# Patient Record
Sex: MALE | Race: WHITE | Employment: OTHER | URBAN - METROPOLITAN AREA
[De-identification: names, ages, dates, MRNs, and addresses within clinical notes are randomized per-mention and may not be internally consistent; named-entity substitution may affect disease eponyms.]

---

## 2017-07-17 ENCOUNTER — HOSPITAL ENCOUNTER (OUTPATIENT)
Age: 77
Setting detail: SPECIMEN
Discharge: HOME | End: 2017-07-17
Payer: MEDICARE

## 2017-07-19 LAB — DERMATOLOGY PATHOLOGY REPORT: NORMAL

## 2017-09-13 PROBLEM — D23.9 DYSPLASTIC NEVUS: Status: ACTIVE | Noted: 2017-09-13

## 2019-01-17 PROBLEM — D48.5 NEOPLASM OF UNCERTAIN BEHAVIOR OF SKIN: Status: ACTIVE | Noted: 2019-01-17

## 2019-10-18 ENCOUNTER — OFFICE VISIT (OUTPATIENT)
Dept: PULMONOLOGY | Age: 79
End: 2019-10-18
Payer: MEDICARE

## 2019-10-18 VITALS
HEIGHT: 68 IN | RESPIRATION RATE: 12 BRPM | DIASTOLIC BLOOD PRESSURE: 74 MMHG | OXYGEN SATURATION: 95 % | BODY MASS INDEX: 27.49 KG/M2 | HEART RATE: 65 BPM | WEIGHT: 181.4 LBS | SYSTOLIC BLOOD PRESSURE: 120 MMHG

## 2019-10-18 DIAGNOSIS — D48.5 NEOPLASM OF UNCERTAIN BEHAVIOR OF SKIN: ICD-10-CM

## 2019-10-18 DIAGNOSIS — G47.33 OBSTRUCTIVE SLEEP APNEA SYNDROME: Primary | ICD-10-CM

## 2019-10-18 PROCEDURE — 99203 OFFICE O/P NEW LOW 30 MIN: CPT | Performed by: INTERNAL MEDICINE

## 2019-10-18 ASSESSMENT — ENCOUNTER SYMPTOMS
APNEA: 1
EYES NEGATIVE: 1

## 2019-10-18 ASSESSMENT — SLEEP AND FATIGUE QUESTIONNAIRES
ESS TOTAL SCORE: 10
HOW LIKELY ARE YOU TO NOD OFF OR FALL ASLEEP WHILE SITTING AND READING: 1
HOW LIKELY ARE YOU TO NOD OFF OR FALL ASLEEP WHILE WATCHING TV: 2
HOW LIKELY ARE YOU TO NOD OFF OR FALL ASLEEP WHEN YOU ARE A PASSENGER IN A CAR FOR AN HOUR WITHOUT A BREAK: 0
HOW LIKELY ARE YOU TO NOD OFF OR FALL ASLEEP WHILE SITTING QUIETLY AFTER LUNCH WITHOUT ALCOHOL: 2
HOW LIKELY ARE YOU TO NOD OFF OR FALL ASLEEP IN A CAR, WHILE STOPPED FOR A FEW MINUTES IN TRAFFIC: 0
HOW LIKELY ARE YOU TO NOD OFF OR FALL ASLEEP WHILE LYING DOWN TO REST IN THE AFTERNOON WHEN CIRCUMSTANCES PERMIT: 3
HOW LIKELY ARE YOU TO NOD OFF OR FALL ASLEEP WHILE SITTING INACTIVE IN A PUBLIC PLACE: 2
HOW LIKELY ARE YOU TO NOD OFF OR FALL ASLEEP WHILE SITTING AND TALKING TO SOMEONE: 0

## 2019-11-08 ENCOUNTER — HOSPITAL ENCOUNTER (OUTPATIENT)
Dept: SLEEP CENTER | Age: 79
Discharge: HOME | End: 2019-11-10
Payer: MEDICARE

## 2019-11-08 DIAGNOSIS — G47.33 OBSTRUCTIVE SLEEP APNEA SYNDROME: ICD-10-CM

## 2019-11-08 DIAGNOSIS — D48.5 NEOPLASM OF UNCERTAIN BEHAVIOR OF SKIN: ICD-10-CM

## 2019-11-08 PROCEDURE — 95810 POLYSOM 6/> YRS 4/> PARAM: CPT

## 2019-12-05 LAB — STATUS: NORMAL

## 2019-12-11 ENCOUNTER — HOSPITAL ENCOUNTER (OUTPATIENT)
Dept: SLEEP CENTER | Age: 79
Discharge: HOME | End: 2019-12-13
Payer: MEDICARE

## 2019-12-11 DIAGNOSIS — D48.5 NEOPLASM OF UNCERTAIN BEHAVIOR OF SKIN: ICD-10-CM

## 2019-12-11 DIAGNOSIS — G47.33 OBSTRUCTIVE SLEEP APNEA SYNDROME: ICD-10-CM

## 2019-12-11 PROCEDURE — 95811 POLYSOM 6/>YRS CPAP 4/> PARM: CPT

## 2019-12-12 VITALS
HEART RATE: 62 BPM | HEIGHT: 68 IN | RESPIRATION RATE: 14 BRPM | BODY MASS INDEX: 27.28 KG/M2 | WEIGHT: 180 LBS | OXYGEN SATURATION: 97 %

## 2019-12-19 LAB — STATUS: NORMAL

## 2020-02-14 ENCOUNTER — OFFICE VISIT (OUTPATIENT)
Dept: PULMONOLOGY | Age: 80
End: 2020-02-14
Payer: MEDICARE

## 2020-02-14 VITALS
OXYGEN SATURATION: 99 % | DIASTOLIC BLOOD PRESSURE: 72 MMHG | SYSTOLIC BLOOD PRESSURE: 133 MMHG | HEART RATE: 62 BPM | WEIGHT: 182.4 LBS | RESPIRATION RATE: 18 BRPM | HEIGHT: 68 IN | BODY MASS INDEX: 27.65 KG/M2

## 2020-02-14 PROCEDURE — 99213 OFFICE O/P EST LOW 20 MIN: CPT | Performed by: INTERNAL MEDICINE

## 2020-02-14 ASSESSMENT — ENCOUNTER SYMPTOMS
EYES NEGATIVE: 1
RESPIRATORY NEGATIVE: 1

## 2020-02-26 PROBLEM — H90.5 SENSORINEURAL HEARING LOSS: Status: ACTIVE | Noted: 2017-05-30

## 2020-02-26 PROBLEM — R07.9 CHEST PAIN: Status: ACTIVE | Noted: 2017-10-15

## 2020-02-26 PROBLEM — R97.20 ELEVATED PSA: Status: ACTIVE | Noted: 2017-02-14

## 2020-02-26 PROBLEM — C64.9 RENAL CANCER (HCC): Status: ACTIVE | Noted: 2017-02-13

## 2020-02-26 PROBLEM — N40.1 BPH WITH OBSTRUCTION/LOWER URINARY TRACT SYMPTOMS: Status: ACTIVE | Noted: 2017-05-30

## 2020-02-26 PROBLEM — H21.569: Status: ACTIVE | Noted: 2017-05-30

## 2020-02-26 PROBLEM — R09.89 BRUIT: Status: ACTIVE | Noted: 2017-10-17

## 2020-02-26 PROBLEM — N28.1 RENAL CYST: Status: ACTIVE | Noted: 2017-02-13

## 2020-02-26 PROBLEM — N13.8 BPH WITH OBSTRUCTION/LOWER URINARY TRACT SYMPTOMS: Status: ACTIVE | Noted: 2017-05-30

## 2020-02-26 PROBLEM — E87.5 HYPERKALEMIA: Status: ACTIVE | Noted: 2018-06-08

## 2020-02-26 PROBLEM — M51.369 DEGENERATION OF INTERVERTEBRAL DISC OF LUMBAR REGION: Status: ACTIVE | Noted: 2017-05-30

## 2020-02-26 PROBLEM — M51.36 DEGENERATION OF INTERVERTEBRAL DISC OF LUMBAR REGION: Status: ACTIVE | Noted: 2017-05-30

## 2020-02-26 PROBLEM — R39.9 LOWER URINARY TRACT SYMPTOMS: Status: ACTIVE | Noted: 2017-02-13

## 2020-02-26 PROBLEM — N21.0 BLADDER CALCULUS: Status: ACTIVE | Noted: 2017-03-07

## 2021-03-19 ENCOUNTER — OFFICE VISIT (OUTPATIENT)
Dept: PULMONOLOGY | Age: 81
End: 2021-03-19
Payer: MEDICARE

## 2021-03-19 VITALS
TEMPERATURE: 97.3 F | SYSTOLIC BLOOD PRESSURE: 137 MMHG | OXYGEN SATURATION: 97 % | BODY MASS INDEX: 26.67 KG/M2 | HEART RATE: 62 BPM | DIASTOLIC BLOOD PRESSURE: 70 MMHG | RESPIRATION RATE: 12 BRPM | HEIGHT: 68 IN | WEIGHT: 176 LBS

## 2021-03-19 DIAGNOSIS — Z99.89 OSA ON CPAP: Primary | ICD-10-CM

## 2021-03-19 DIAGNOSIS — G47.33 OSA ON CPAP: Primary | ICD-10-CM

## 2021-03-19 DIAGNOSIS — D48.5 NEOPLASM OF UNCERTAIN BEHAVIOR OF SKIN: ICD-10-CM

## 2021-03-19 DIAGNOSIS — F03.90 DEMENTIA WITHOUT BEHAVIORAL DISTURBANCE, UNSPECIFIED DEMENTIA TYPE: ICD-10-CM

## 2021-03-19 PROCEDURE — 99213 OFFICE O/P EST LOW 20 MIN: CPT | Performed by: INTERNAL MEDICINE

## 2021-03-19 RX ORDER — DONEPEZIL HYDROCHLORIDE 5 MG/1
10 TABLET, FILM COATED ORAL NIGHTLY
COMMUNITY

## 2021-03-19 ASSESSMENT — ENCOUNTER SYMPTOMS
EYES NEGATIVE: 1
RESPIRATORY NEGATIVE: 1

## 2021-03-19 ASSESSMENT — SLEEP AND FATIGUE QUESTIONNAIRES
HOW LIKELY ARE YOU TO NOD OFF OR FALL ASLEEP WHEN YOU ARE A PASSENGER IN A CAR FOR AN HOUR WITHOUT A BREAK: 1
HOW LIKELY ARE YOU TO NOD OFF OR FALL ASLEEP IN A CAR, WHILE STOPPED FOR A FEW MINUTES IN TRAFFIC: 0
HOW LIKELY ARE YOU TO NOD OFF OR FALL ASLEEP WHILE WATCHING TV: 1
HOW LIKELY ARE YOU TO NOD OFF OR FALL ASLEEP WHILE SITTING AND READING: 3

## 2021-03-19 NOTE — PROGRESS NOTES
Subjective:      Patient ID: Colonel Ch is a 80 y.o. male. HPI  Patient returns for follow up of sleep apnea. Since last visit 12 months ago, patient has been very compliant with CPAP. Uses every night, for the entire night. Reports refreshing and restorative sleep. Denies snoring. Reports normal daytime alertness. Believes benefiting greatly. Compliance download from 12/11/20 to 3/10/21 shows 100% compliance for average of 8hrs per night. Residual AHI 2.9. Mean pressure8.1 cm H2O. Wife states that he has mild dementia. Currently on small dose of Aricept. No longer writing his conventional bike although he rides a recumbent cycle and seems to be enjoying it. Remains physically active. Review of Systems   Constitutional: Negative. HENT: Negative. Eyes: Negative. Respiratory: Negative. Cardiovascular: Negative. Psychiatric/Behavioral: Positive for confusion. All other systems reviewed and are negative. Objective:     Physical Exam  Vitals signs and nursing note reviewed. Constitutional:       Appearance: He is well-developed. HENT:      Mouth/Throat:      Comments: Large tongue , low hanging soft palate and large uvula. Overall pharyngeal orifice moderately decreased    Eyes:      General: No scleral icterus. Conjunctiva/sclera: Conjunctivae normal.   Neck:      Musculoskeletal: Neck supple. Thyroid: No thyromegaly. Vascular: No JVD. Trachea: No tracheal deviation. Cardiovascular:      Rate and Rhythm: Normal rate and regular rhythm. Heart sounds: Normal heart sounds. No murmur. No gallop. Pulmonary:      Effort: Pulmonary effort is normal. No respiratory distress. Breath sounds: No wheezing or rales. Chest:      Chest wall: No tenderness. Abdominal:      Palpations: Abdomen is soft. Tenderness: There is no abdominal tenderness. Lymphadenopathy:      Cervical: No cervical adenopathy. Skin:     General: Skin is warm and dry. Neurological:      Mental Status: He is alert and oriented to person, place, and time. Wt Readings from Last 3 Encounters:   03/19/21 176 lb (79.8 kg)   03/09/21 173 lb (78.5 kg)   03/03/20 180 lb (81.6 kg)          Results for orders placed or performed during the hospital encounter of 12/11/19   Sleep Study with PAP Titration   Result Value Ref Range    Status Interpreted        Assessment:         1. YARIEL on CPAP    2. Neoplasm of uncertain behavior of skin    3. Dementia without behavioral disturbance, unspecified dementia type ([facility] [address]          Plan:      1. Continue CPAP. 2. Avoid sedative hypnotics and alcohol at bedtime. 3. Maintain optimal weight. 4. Patient and wife received both of their Covid vaccines. 5. Return in 1 year.      Electronically signed by Frankie Lord DO on 3/19/2021at 1:41 PM

## 2022-03-25 ENCOUNTER — OFFICE VISIT (OUTPATIENT)
Dept: PULMONOLOGY | Age: 82
End: 2022-03-25
Payer: MEDICARE

## 2022-03-25 VITALS
RESPIRATION RATE: 16 BRPM | TEMPERATURE: 98 F | SYSTOLIC BLOOD PRESSURE: 134 MMHG | DIASTOLIC BLOOD PRESSURE: 75 MMHG | BODY MASS INDEX: 27.28 KG/M2 | WEIGHT: 180 LBS | OXYGEN SATURATION: 98 % | HEART RATE: 62 BPM | HEIGHT: 68 IN

## 2022-03-25 DIAGNOSIS — Z99.89 OSA ON CPAP: Primary | ICD-10-CM

## 2022-03-25 DIAGNOSIS — D48.5 NEOPLASM OF UNCERTAIN BEHAVIOR OF SKIN: ICD-10-CM

## 2022-03-25 DIAGNOSIS — F03.90 DEMENTIA WITHOUT BEHAVIORAL DISTURBANCE, UNSPECIFIED DEMENTIA TYPE: ICD-10-CM

## 2022-03-25 DIAGNOSIS — G47.33 OSA ON CPAP: Primary | ICD-10-CM

## 2022-03-25 PROCEDURE — 99213 OFFICE O/P EST LOW 20 MIN: CPT | Performed by: INTERNAL MEDICINE

## 2022-03-25 ASSESSMENT — ENCOUNTER SYMPTOMS
RESPIRATORY NEGATIVE: 1
EYES NEGATIVE: 1

## 2022-03-25 NOTE — PATIENT INSTRUCTIONS
Printed DME order and AVS for patient. Patient prefers to go to [facility] on [facility] [place] and be in person.   nik

## 2022-03-25 NOTE — PROGRESS NOTES
Subjective:      Patient ID: Alena Nassar is a [de-identified] y.o. male being seen in my clinic for   Chief Complaint   Patient presents with    Follow-up     1 year        HPI  Patient returns for follow up of sleep apnea. Since last visit 12 months ago, patient has been very compliant with CPAP. Uses every night, for the entire night. Reports refreshing and restorative sleep. Denies snoring. Reports normal daytime alertness. Believes benefiting greatly. Compliance download from 12/23/21 to 3/22/22 shows 100% compliance for average of 7.75hrs per night. Residual AHI 2.1. Mean pressure7.9 cm H2O. Review of Systems   Constitutional: Negative. HENT: Negative. Eyes: Negative. Respiratory: Negative. Cardiovascular: Negative. All other systems reviewed and are negative.       Objective:     Vitals:    03/25/22 1311   BP: 134/75   Site: Right Upper Arm   Pulse: 62   Resp: 16   Temp: 98 °F (36.7 °C)   TempSrc: Temporal   SpO2: 98%   Weight: 180 lb (81.6 kg)   Height: 5' 8\" (1.727 m)     Current Outpatient Medications   Medication Sig Dispense Refill    Multiple Vitamin (MULTI-VITAMIN DAILY PO) Take by mouth      donepezil (ARICEPT) 5 MG tablet Take 10 mg by mouth nightly       Omega-3 Fatty Acids (OMEGA-3 FISH OIL PO) Take 2 capsules by mouth daily      Coenzyme Q10 200 MG CAPS Take 1 capsule by mouth daily      allopurinol (ZYLOPRIM) 300 MG tablet Take 300 mg by mouth daily       Flaxseed, Linseed, (FLAXSEED OIL PO) Take 2 capsules by mouth daily      zoster recombinant adjuvanted vaccine [facility]) 50 MCG/0.5ML SUSR injection Inject 0.5 mLs into the muscle once (Patient not taking: Reported on 3/25/2022)      Misc Natural Products (MATURE ANA HERBAL REMEDY) CAPS Take 2 capsules by mouth 3 times daily JuicePlus (2 fruit, 2 grapes and berries, 2 vegetables / per day) (Patient not taking: Reported on 3/25/2022)      aspirin 81 MG tablet Take 81 mg by mouth daily (Patient not taking: Reported on 3/25/2022)       No current facility-administered medications for this visit. Physical Exam  Vitals and nursing note reviewed. Constitutional:       Appearance: He is well-developed. HENT:      Mouth/Throat:      Mouth: Mucous membranes are moist.      Pharynx: Oropharynx is clear. No oropharyngeal exudate. Comments: Large tongue , low hanging soft palate and large uvula. Overall pharyngeal orifice moderately decreased    Eyes:      General: No scleral icterus. Conjunctiva/sclera: Conjunctivae normal.   Neck:      Thyroid: No thyromegaly. Vascular: No JVD. Trachea: No tracheal deviation. Cardiovascular:      Rate and Rhythm: Normal rate and regular rhythm. Heart sounds: Normal heart sounds. No murmur heard. No gallop. Pulmonary:      Effort: Pulmonary effort is normal. No respiratory distress. Breath sounds: No wheezing or rales. Chest:      Chest wall: No tenderness. Abdominal:      Palpations: Abdomen is soft. Tenderness: There is no abdominal tenderness. Musculoskeletal:      Cervical back: Neck supple. Lymphadenopathy:      Cervical: No cervical adenopathy. Skin:     General: Skin is warm and dry. Neurological:      Mental Status: He is alert and oriented to person, place, and time. Wt Readings from Last 3 Encounters:   03/25/22 180 lb (81.6 kg)   03/19/21 176 lb (79.8 kg)   03/09/21 173 lb (78.5 kg)     Results for orders placed or performed during the hospital encounter of 12/11/19   Sleep Study with PAP Titration   Result Value Ref Range    Status Interpreted        :      1. YARIEL on CPAP    2. Neoplasm of uncertain behavior of skin    3.  Dementia without behavioral disturbance, unspecified dementia type [facility])      Patient Active Problem List   Diagnosis    Dysplastic nevus    Neoplasm of uncertain behavior of skin    Sensorineural hearing loss    Renal cancer ([facility] [address]    Pupil irregularity    Lower urinary tract symptoms    Renal cyst    Hyperkalemia    Elevated PSA    Degeneration of intervertebral disc of lumbar region    Chest pain    Bruit    BPH with obstruction/lower urinary tract symptoms    Bladder calculus         Plan:      1. Encourage CPAP use   2. Avoid sedative hypnotics and alcohol at bedtime  3. Weight loss  4. Exercise caution when driving and other high risk activities of not adequately treated for sleep apnea  5. Instructed to bring CPAP machine for use post op  6. New CPAP mask tubing and supplies. 7. Return in 1 year. No orders of the defined types were placed in this encounter. Orders Placed This Encounter   Procedures    CO DURABLE MEDICAL EQUIPMENT MI     New CPAP mask tubing and supplies. Return in about 1 year (around 3/25/2023).        Electronically signed by Emil Loyola DO on 3/25/2022at 3:31 PM

## 2022-08-21 ENCOUNTER — APPOINTMENT (OUTPATIENT)
Dept: CT IMAGING | Age: 82
DRG: 310 | End: 2022-08-21
Payer: MEDICARE

## 2022-08-21 ENCOUNTER — HOSPITAL ENCOUNTER (INPATIENT)
Age: 82
LOS: 1 days | Discharge: ANOTHER HOSPITAL | DRG: 310 | End: 2022-08-22
Attending: STUDENT IN AN ORGANIZED HEALTH CARE EDUCATION/TRAINING PROGRAM | Admitting: STUDENT IN AN ORGANIZED HEALTH CARE EDUCATION/TRAINING PROGRAM
Payer: MEDICARE

## 2022-08-21 ENCOUNTER — APPOINTMENT (OUTPATIENT)
Dept: GENERAL RADIOLOGY | Age: 82
DRG: 310 | End: 2022-08-21
Payer: MEDICARE

## 2022-08-21 DIAGNOSIS — R55 SYNCOPE, CARDIOGENIC: Primary | ICD-10-CM

## 2022-08-21 DIAGNOSIS — I44.0 FIRST DEGREE AV BLOCK: ICD-10-CM

## 2022-08-21 PROBLEM — I49.9 ARRHYTHMIA: Status: ACTIVE | Noted: 2022-08-21

## 2022-08-21 LAB
ABSOLUTE EOS #: 0.05 K/UL (ref 0–0.44)
ABSOLUTE IMMATURE GRANULOCYTE: 0.03 K/UL (ref 0–0.3)
ABSOLUTE LYMPH #: 1.16 K/UL (ref 1.1–3.7)
ABSOLUTE MONO #: 0.39 K/UL (ref 0.1–1.2)
ALBUMIN SERPL-MCNC: 4 G/DL (ref 3.5–5.2)
ALP BLD-CCNC: 50 U/L (ref 40–129)
ALT SERPL-CCNC: 18 U/L (ref 5–41)
ANION GAP SERPL CALCULATED.3IONS-SCNC: 8 MMOL/L (ref 9–17)
AST SERPL-CCNC: 19 U/L
BASOPHILS # BLD: 0 % (ref 0–2)
BASOPHILS ABSOLUTE: <0.03 K/UL (ref 0–0.2)
BILIRUB SERPL-MCNC: 0.39 MG/DL (ref 0.3–1.2)
BUN BLDV-MCNC: 35 MG/DL (ref 8–23)
BUN/CREAT BLD: 39 (ref 9–20)
CALCIUM SERPL-MCNC: 9.2 MG/DL (ref 8.6–10.4)
CHLORIDE BLD-SCNC: 101 MMOL/L (ref 98–107)
CO2: 26 MMOL/L (ref 20–31)
CREAT SERPL-MCNC: 0.9 MG/DL (ref 0.7–1.2)
EOSINOPHILS RELATIVE PERCENT: 1 % (ref 1–4)
GFR AFRICAN AMERICAN: >60 ML/MIN
GFR NON-AFRICAN AMERICAN: >60 ML/MIN
GFR SERPL CREATININE-BSD FRML MDRD: ABNORMAL ML/MIN/{1.73_M2}
GLUCOSE BLD-MCNC: 144 MG/DL (ref 70–99)
HCT VFR BLD CALC: 38 % (ref 40.7–50.3)
HEMOGLOBIN: 12.7 G/DL (ref 13–17)
IMMATURE GRANULOCYTES: 0 %
INR BLD: 1.1
LYMPHOCYTES # BLD: 14 % (ref 24–43)
MCH RBC QN AUTO: 32.4 PG (ref 25.2–33.5)
MCHC RBC AUTO-ENTMCNC: 33.4 G/DL (ref 28.4–34.8)
MCV RBC AUTO: 96.9 FL (ref 82.6–102.9)
MONOCYTES # BLD: 5 % (ref 3–12)
NRBC AUTOMATED: 0 PER 100 WBC
PARTIAL THROMBOPLASTIN TIME: 28.2 SEC (ref 23.9–33.8)
PDW BLD-RTO: 12.9 % (ref 11.8–14.4)
PLATELET # BLD: 164 K/UL (ref 138–453)
PMV BLD AUTO: 9.9 FL (ref 8.1–13.5)
POTASSIUM SERPL-SCNC: 4.2 MMOL/L (ref 3.7–5.3)
PRO-BNP: 87 PG/ML
PROTHROMBIN TIME: 13.8 SEC (ref 11.5–14.2)
RBC # BLD: 3.92 M/UL (ref 4.21–5.77)
SEG NEUTROPHILS: 80 % (ref 36–65)
SEGMENTED NEUTROPHILS ABSOLUTE COUNT: 6.68 K/UL (ref 1.5–8.1)
SODIUM BLD-SCNC: 135 MMOL/L (ref 135–144)
TOTAL PROTEIN: 6.3 G/DL (ref 6.4–8.3)
TROPONIN, HIGH SENSITIVITY: 12 NG/L (ref 0–22)
WBC # BLD: 8.3 K/UL (ref 3.5–11.3)

## 2022-08-21 PROCEDURE — 83880 ASSAY OF NATRIURETIC PEPTIDE: CPT

## 2022-08-21 PROCEDURE — 96374 THER/PROPH/DIAG INJ IV PUSH: CPT

## 2022-08-21 PROCEDURE — 71045 X-RAY EXAM CHEST 1 VIEW: CPT

## 2022-08-21 PROCEDURE — 96375 TX/PRO/DX INJ NEW DRUG ADDON: CPT

## 2022-08-21 PROCEDURE — 85025 COMPLETE CBC W/AUTO DIFF WBC: CPT

## 2022-08-21 PROCEDURE — 70450 CT HEAD/BRAIN W/O DYE: CPT

## 2022-08-21 PROCEDURE — 85730 THROMBOPLASTIN TIME PARTIAL: CPT

## 2022-08-21 PROCEDURE — 84484 ASSAY OF TROPONIN QUANT: CPT

## 2022-08-21 PROCEDURE — 2000000000 HC ICU R&B

## 2022-08-21 PROCEDURE — 85610 PROTHROMBIN TIME: CPT

## 2022-08-21 PROCEDURE — 6360000002 HC RX W HCPCS

## 2022-08-21 PROCEDURE — 80053 COMPREHEN METABOLIC PANEL: CPT

## 2022-08-21 PROCEDURE — 93005 ELECTROCARDIOGRAM TRACING: CPT | Performed by: STUDENT IN AN ORGANIZED HEALTH CARE EDUCATION/TRAINING PROGRAM

## 2022-08-21 PROCEDURE — 6360000002 HC RX W HCPCS: Performed by: STUDENT IN AN ORGANIZED HEALTH CARE EDUCATION/TRAINING PROGRAM

## 2022-08-21 PROCEDURE — 99285 EMERGENCY DEPT VISIT HI MDM: CPT

## 2022-08-21 PROCEDURE — 5A2204Z RESTORATION OF CARDIAC RHYTHM, SINGLE: ICD-10-PCS | Performed by: STUDENT IN AN ORGANIZED HEALTH CARE EDUCATION/TRAINING PROGRAM

## 2022-08-21 RX ORDER — ONDANSETRON 2 MG/ML
INJECTION INTRAMUSCULAR; INTRAVENOUS
Status: COMPLETED
Start: 2022-08-21 | End: 2022-08-21

## 2022-08-21 RX ORDER — ONDANSETRON 2 MG/ML
4 INJECTION INTRAMUSCULAR; INTRAVENOUS ONCE
Status: COMPLETED | OUTPATIENT
Start: 2022-08-21 | End: 2022-08-21

## 2022-08-21 RX ORDER — FENTANYL CITRATE 0.05 MG/ML
25 INJECTION, SOLUTION INTRAMUSCULAR; INTRAVENOUS ONCE
Status: COMPLETED | OUTPATIENT
Start: 2022-08-21 | End: 2022-08-21

## 2022-08-21 RX ADMIN — ONDANSETRON 4 MG: 2 INJECTION INTRAMUSCULAR; INTRAVENOUS at 23:29

## 2022-08-21 RX ADMIN — FENTANYL CITRATE 25 MCG: 0.05 INJECTION, SOLUTION INTRAMUSCULAR; INTRAVENOUS at 23:37

## 2022-08-21 ASSESSMENT — PAIN SCALES - GENERAL: PAINLEVEL_OUTOF10: 0

## 2022-08-21 ASSESSMENT — PAIN - FUNCTIONAL ASSESSMENT: PAIN_FUNCTIONAL_ASSESSMENT: 0-10

## 2022-08-22 VITALS
TEMPERATURE: 98.9 F | DIASTOLIC BLOOD PRESSURE: 62 MMHG | WEIGHT: 180 LBS | BODY MASS INDEX: 27.28 KG/M2 | OXYGEN SATURATION: 97 % | HEIGHT: 68 IN | HEART RATE: 61 BPM | RESPIRATION RATE: 17 BRPM | SYSTOLIC BLOOD PRESSURE: 117 MMHG

## 2022-08-22 PROBLEM — R73.9 HYPERGLYCEMIA: Status: ACTIVE | Noted: 2022-08-22

## 2022-08-22 PROBLEM — N20.0 KIDNEY STONE: Status: ACTIVE | Noted: 2017-02-13

## 2022-08-22 PROBLEM — R55 SYNCOPE, CARDIOGENIC: Status: ACTIVE | Noted: 2022-08-22

## 2022-08-22 PROBLEM — E86.1 HYPOTENSION DUE TO HYPOVOLEMIA: Status: ACTIVE | Noted: 2020-09-01

## 2022-08-22 PROBLEM — I95.89 HYPOTENSION DUE TO HYPOVOLEMIA: Status: ACTIVE | Noted: 2020-09-01

## 2022-08-22 PROBLEM — I44.0 FIRST DEGREE AV BLOCK: Status: ACTIVE | Noted: 2022-08-22

## 2022-08-22 LAB
ANION GAP SERPL CALCULATED.3IONS-SCNC: 7 MMOL/L (ref 9–17)
BILIRUBIN URINE: NEGATIVE
BUN BLDV-MCNC: 32 MG/DL (ref 8–23)
BUN/CREAT BLD: 46 (ref 9–20)
CALCIUM SERPL-MCNC: 8.9 MG/DL (ref 8.6–10.4)
CHLORIDE BLD-SCNC: 104 MMOL/L (ref 98–107)
CO2: 26 MMOL/L (ref 20–31)
COLOR: YELLOW
CREAT SERPL-MCNC: 0.69 MG/DL (ref 0.7–1.2)
EPITHELIAL CELLS UA: ABNORMAL /HPF (ref 0–5)
ESTIMATED AVERAGE GLUCOSE: 111 MG/DL
GFR AFRICAN AMERICAN: >60 ML/MIN
GFR NON-AFRICAN AMERICAN: >60 ML/MIN
GFR SERPL CREATININE-BSD FRML MDRD: ABNORMAL ML/MIN/{1.73_M2}
GLUCOSE BLD-MCNC: 113 MG/DL (ref 75–110)
GLUCOSE BLD-MCNC: 154 MG/DL (ref 70–99)
GLUCOSE URINE: NEGATIVE
HBA1C MFR BLD: 5.5 % (ref 4–6)
KETONES, URINE: NEGATIVE
LEUKOCYTE ESTERASE, URINE: NEGATIVE
MAGNESIUM: 2 MG/DL (ref 1.6–2.6)
MUCUS: ABNORMAL
NITRITE, URINE: NEGATIVE
PH UA: 6 (ref 5–8)
POTASSIUM SERPL-SCNC: 4.3 MMOL/L (ref 3.7–5.3)
PRO-BNP: 139 PG/ML
PROTEIN UA: NEGATIVE
RBC UA: ABNORMAL /HPF (ref 0–2)
SODIUM BLD-SCNC: 137 MMOL/L (ref 135–144)
SPECIFIC GRAVITY UA: 1.05 (ref 1–1.03)
TURBIDITY: ABNORMAL
URINE HGB: NEGATIVE
UROBILINOGEN, URINE: NORMAL
WBC UA: ABNORMAL /HPF (ref 0–5)

## 2022-08-22 PROCEDURE — APPSS60 APP SPLIT SHARED TIME 46-60 MINUTES: Performed by: NURSE PRACTITIONER

## 2022-08-22 PROCEDURE — 97163 PT EVAL HIGH COMPLEX 45 MIN: CPT

## 2022-08-22 PROCEDURE — 97112 NEUROMUSCULAR REEDUCATION: CPT

## 2022-08-22 PROCEDURE — 2580000003 HC RX 258: Performed by: NURSE PRACTITIONER

## 2022-08-22 PROCEDURE — 36415 COLL VENOUS BLD VENIPUNCTURE: CPT

## 2022-08-22 PROCEDURE — 99238 HOSP IP/OBS DSCHRG MGMT 30/<: CPT | Performed by: STUDENT IN AN ORGANIZED HEALTH CARE EDUCATION/TRAINING PROGRAM

## 2022-08-22 PROCEDURE — 83880 ASSAY OF NATRIURETIC PEPTIDE: CPT

## 2022-08-22 PROCEDURE — 83036 HEMOGLOBIN GLYCOSYLATED A1C: CPT

## 2022-08-22 PROCEDURE — 97530 THERAPEUTIC ACTIVITIES: CPT

## 2022-08-22 PROCEDURE — 6370000000 HC RX 637 (ALT 250 FOR IP): Performed by: NURSE PRACTITIONER

## 2022-08-22 PROCEDURE — 2700000000 HC OXYGEN THERAPY PER DAY

## 2022-08-22 PROCEDURE — 94761 N-INVAS EAR/PLS OXIMETRY MLT: CPT

## 2022-08-22 PROCEDURE — 97116 GAIT TRAINING THERAPY: CPT

## 2022-08-22 PROCEDURE — 97535 SELF CARE MNGMENT TRAINING: CPT

## 2022-08-22 PROCEDURE — 83735 ASSAY OF MAGNESIUM: CPT

## 2022-08-22 PROCEDURE — 82947 ASSAY GLUCOSE BLOOD QUANT: CPT

## 2022-08-22 PROCEDURE — 97167 OT EVAL HIGH COMPLEX 60 MIN: CPT

## 2022-08-22 PROCEDURE — 81001 URINALYSIS AUTO W/SCOPE: CPT

## 2022-08-22 PROCEDURE — 80048 BASIC METABOLIC PNL TOTAL CA: CPT

## 2022-08-22 RX ORDER — SODIUM CHLORIDE 9 MG/ML
INJECTION, SOLUTION INTRAVENOUS PRN
Status: DISCONTINUED | OUTPATIENT
Start: 2022-08-22 | End: 2022-08-22 | Stop reason: HOSPADM

## 2022-08-22 RX ORDER — ENOXAPARIN SODIUM 100 MG/ML
40 INJECTION SUBCUTANEOUS DAILY
Status: DISCONTINUED | OUTPATIENT
Start: 2022-08-22 | End: 2022-08-22

## 2022-08-22 RX ORDER — ENOXAPARIN SODIUM 100 MG/ML
40 INJECTION SUBCUTANEOUS DAILY
Status: CANCELLED | OUTPATIENT
Start: 2022-08-22

## 2022-08-22 RX ORDER — MULTIVITAMIN WITH IRON
1 TABLET ORAL DAILY
Status: DISCONTINUED | OUTPATIENT
Start: 2022-08-22 | End: 2022-08-22 | Stop reason: HOSPADM

## 2022-08-22 RX ORDER — DONEPEZIL HYDROCHLORIDE 10 MG/1
10 TABLET, FILM COATED ORAL NIGHTLY
Status: DISCONTINUED | OUTPATIENT
Start: 2022-08-22 | End: 2022-08-22 | Stop reason: HOSPADM

## 2022-08-22 RX ORDER — ALLOPURINOL 300 MG/1
300 TABLET ORAL DAILY
Status: DISCONTINUED | OUTPATIENT
Start: 2022-08-22 | End: 2022-08-22 | Stop reason: HOSPADM

## 2022-08-22 RX ORDER — POTASSIUM CHLORIDE 20 MEQ/1
40 TABLET, EXTENDED RELEASE ORAL PRN
Status: DISCONTINUED | OUTPATIENT
Start: 2022-08-22 | End: 2022-08-22 | Stop reason: HOSPADM

## 2022-08-22 RX ORDER — MAGNESIUM SULFATE 1 G/100ML
1000 INJECTION INTRAVENOUS PRN
Status: DISCONTINUED | OUTPATIENT
Start: 2022-08-22 | End: 2022-08-22 | Stop reason: HOSPADM

## 2022-08-22 RX ORDER — ACETAMINOPHEN 325 MG/1
650 TABLET ORAL EVERY 6 HOURS PRN
Status: DISCONTINUED | OUTPATIENT
Start: 2022-08-22 | End: 2022-08-22 | Stop reason: HOSPADM

## 2022-08-22 RX ORDER — ONDANSETRON 2 MG/ML
4 INJECTION INTRAMUSCULAR; INTRAVENOUS EVERY 6 HOURS PRN
Status: DISCONTINUED | OUTPATIENT
Start: 2022-08-22 | End: 2022-08-22 | Stop reason: HOSPADM

## 2022-08-22 RX ORDER — ONDANSETRON 4 MG/1
4 TABLET, ORALLY DISINTEGRATING ORAL EVERY 8 HOURS PRN
Status: DISCONTINUED | OUTPATIENT
Start: 2022-08-22 | End: 2022-08-22 | Stop reason: HOSPADM

## 2022-08-22 RX ORDER — POLYETHYLENE GLYCOL 3350 17 G/17G
17 POWDER, FOR SOLUTION ORAL DAILY PRN
Status: DISCONTINUED | OUTPATIENT
Start: 2022-08-22 | End: 2022-08-22 | Stop reason: HOSPADM

## 2022-08-22 RX ORDER — ACETAMINOPHEN 650 MG/1
650 SUPPOSITORY RECTAL EVERY 6 HOURS PRN
Status: DISCONTINUED | OUTPATIENT
Start: 2022-08-22 | End: 2022-08-22 | Stop reason: HOSPADM

## 2022-08-22 RX ORDER — POTASSIUM CHLORIDE 7.45 MG/ML
10 INJECTION INTRAVENOUS PRN
Status: DISCONTINUED | OUTPATIENT
Start: 2022-08-22 | End: 2022-08-22 | Stop reason: HOSPADM

## 2022-08-22 RX ORDER — SODIUM CHLORIDE 9 MG/ML
INJECTION, SOLUTION INTRAVENOUS CONTINUOUS
Status: DISCONTINUED | OUTPATIENT
Start: 2022-08-22 | End: 2022-08-22 | Stop reason: HOSPADM

## 2022-08-22 RX ORDER — SODIUM CHLORIDE 0.9 % (FLUSH) 0.9 %
10 SYRINGE (ML) INJECTION PRN
Status: DISCONTINUED | OUTPATIENT
Start: 2022-08-22 | End: 2022-08-22 | Stop reason: HOSPADM

## 2022-08-22 RX ORDER — SODIUM CHLORIDE 0.9 % (FLUSH) 0.9 %
5-40 SYRINGE (ML) INJECTION EVERY 12 HOURS SCHEDULED
Status: DISCONTINUED | OUTPATIENT
Start: 2022-08-22 | End: 2022-08-22 | Stop reason: HOSPADM

## 2022-08-22 RX ADMIN — SODIUM CHLORIDE, PRESERVATIVE FREE 10 ML: 5 INJECTION INTRAVENOUS at 09:42

## 2022-08-22 RX ADMIN — ALLOPURINOL 300 MG: 300 TABLET ORAL at 09:42

## 2022-08-22 RX ADMIN — MULTIVITAMIN TABLET 1 TABLET: TABLET at 09:41

## 2022-08-22 RX ADMIN — SODIUM CHLORIDE: 9 INJECTION, SOLUTION INTRAVENOUS at 03:39

## 2022-08-22 ASSESSMENT — ENCOUNTER SYMPTOMS
COLOR CHANGE: 0
EYE DISCHARGE: 0
SHORTNESS OF BREATH: 0
RESPIRATORY NEGATIVE: 1
ABDOMINAL PAIN: 0
GASTROINTESTINAL NEGATIVE: 1
EYE REDNESS: 0
EYES NEGATIVE: 1

## 2022-08-22 NOTE — DISCHARGE SUMMARY
HOSPITALIZATION/Incidental Findings: Patient will need to have a pacemaker placed at [facility] today. Diet: cardiac diet    Activity: As tolerated    Instructions to Patient: Follow-up with cardiology at [facility] today. Discharge Medications:      Medication List        CONTINUE taking these medications      allopurinol 300 MG tablet  Commonly known as: ZYLOPRIM     coenzyme Q10 200 MG Caps capsule     donepezil 5 MG tablet  Commonly known as: ARICEPT     FLAXSEED OIL PO     MULTI-VITAMIN DAILY PO     OMEGA-3 FISH OIL PO            STOP taking these medications      aspirin 81 MG tablet     Mature Mackenzie Herbal Remedy Caps     Shingrix 50 MCG/0.5ML Susr injection  Generic drug: zoster recombinant adjuvanted vaccine              No discharge procedures on file. Time Spent on discharge is  20 mins in patient examination, evaluation, counseling as well as medication reconciliation, prescriptions for required medications, discharge plan and follow up. Electronically signed by   Alissa Hoffman MD  8/22/2022  1:51 PM      Thank you Dr. Kvng Johansen DO for the opportunity to be involved in this patient's care.

## 2022-08-22 NOTE — ED NOTES
01/28/21 1:25 PM     See documentation in the VB [employer] SmartForm       Woo Chase Pt's respirations are reading high because the monitor is sensing the external pacing     Al Counter, RN  08/22/22 [id number]

## 2022-08-22 NOTE — H&P
[facility]  Office: [address], DO, Maile Zavala, DO, Rodney Melara, DO, Bushra Paces Blood, DO, Mary Anne Tay MD, Norma Malin MD, Madhu Pierre MD, Blayne Gunn MD,  Vicki Hartley MD, Alison Peck MD, Valorie Golden, DO, Natacha Jimenes MD,  Chas Ortiz MD, Eliud Valencia MD, Robert Rothman, DO, Lidia Herrera MD, Severiano Border, MD, Madhav Lynn MD, Radha Strange, DO, Bri Sosa MD, Lianet Patel MD, Yvan George, CNP,  Kaity Berry, CNP, Adrian Camejo, CNP, Eulalia Orozco, CNP, Marilu Stevenson PAAbyC, Andrey Lott, [facility], Norma Kenny, CNP, Katia Cameron, CNP, Tadeo Beth, [facility], Jorge Stoner, CNP, Dariana Mercer, CNP, Luc Cobos, CNS, Yi Dunn, [facility], Claudell Belfast, [facility], Sam Hearing, CNP, Ramila Garcia, [facility]           [address] [facility] [place]    HISTORY AND PHYSICAL EXAMINATION            Date:   8/22/2022  Patient name:  Aaron Antonio  Date of admission:  8/21/2022  9:49 PM  MRN:   [id number]  Account:  [de-identified]  YOB: 1940  PCP:    Samuel Ly DO  Room:   2029/2029-01  Code Status:    Full Code    Chief Complaint:     Chief Complaint   Patient presents with    Loss of Consciousness       History Obtained From:     patient    History of Present Illness:     Aaron Antonio is a [de-identified] y.o. Non- / non  male who presents with Loss of Consciousness   and is admitted to the hospital for the management of Arrhythmia. Patient says he has been doing housework with his wife for the last few days and is feeling fatigued. He recalls that last night right before going to bed, he sat down in the chair and he passed out while his wife was talking to him. EMS was called. EMS started transcutaneous pacing and he arrived to the ED with the transcutaneous pacer. He had an episode of SVT and asystole while in ED and he went back to  without intervention.  He was then transferred to [facility]. He continues to have pauses intermittently, but no further syncope. Cardiology was consulted. Plan for permanent pacer today. Past Medical History:     Past Medical History:   Diagnosis Date    Angina pectoris ([facility] [address]     BPH (benign prostatic hyperplasia) 06/05/2017    Calcified granuloma of lung ([facility] [address]     left hilum    Chest pain 10/15/2017    Coronary artery disease     Dizziness     Former smoker     GERD (gastroesophageal reflux disease)     HL (hearing loss)     [place] (hard of hearing)     Hyperkalemia 06/08/2018    Mild acid reflux     caffeine-induced    Myocardial infarction ([address]     Nearsightedness     wears glasses    Numbness     bilateral lower legs, EMG 11/2016 was WNL    Personal history of kidney cancer 2001    partial nephrectomy    Pigmented skin lesion     of abdomen    Skin lesion of chest wall     left chest    Skin lesion of scalp     right scalp    Wears glasses     nearsighted    Wears partial dentures     upper only        Past Surgical History:     Past Surgical History:   Procedure Laterality Date    CATARACT REMOVAL WITH IMPLANT Left     CYSTOURETHROSCOPY  06/05/2017    Cystolitholapaxy: \"Flushed out a bladder full of stones\" under general anesthesia. INCISIONAL HERNIA REPAIR Left     left mid-abdominal incisional hernia s/p partial nephrectomy    INGUINAL HERNIA REPAIR Right     first    INGUINAL HERNIA REPAIR Left     second    KIDNEY STONE SURGERY Left 1970    KIDNEY STONE SURGERY      basket extraction    LITHOTRIPSY      PARTIAL NEPHRECTOMY  2001    laparoscopic surgery due to cancer    TURP  06/05/2017        Medications Prior to Admission:     Prior to Admission medications    Medication Sig Start Date End Date Taking?  Authorizing Provider   Multiple Vitamin (MULTI-VITAMIN DAILY PO) Take by mouth    Historical Provider, MD   donepezil (ARICEPT) 5 MG tablet Take 10 mg by mouth nightly     Historical Provider, MD   zoster recombinant adjuvanted vaccine [facility]) 50 MCG/0.5ML SUSR injection Inject 0.5 mLs into the muscle once  Patient not taking: Reported on 3/25/2022    Historical Provider, MD   Omega-3 Fatty Acids (OMEGA-3 FISH OIL PO) Take 2 capsules by mouth daily    Historical Provider, MD   Misc Natural Products (MATURE ANA HERBAL REMEDY) CAPS Take 2 capsules by mouth 3 times daily JuicePlus (2 fruit, 2 grapes and berries, 2 vegetables / per day)  Patient not taking: Reported on 3/25/2022    Historical Provider, MD   Coenzyme Q10 200 MG CAPS Take 1 capsule by mouth daily    Historical Provider, MD   allopurinol (ZYLOPRIM) 300 MG tablet Take 300 mg by mouth daily  5/15/17   Historical Provider, MD   Flaxseed, Linseed, (FLAXSEED OIL PO) Take 2 capsules by mouth daily    Historical Provider, MD   aspirin 81 MG tablet Take 81 mg by mouth daily  Patient not taking: Reported on 3/25/2022    Historical Provider, MD        Allergies:     Patient has no known allergies. Social History:     Tobacco:    reports that he quit smoking about 50 years ago. His smoking use included cigarettes. He has a 0.06 pack-year smoking history. He has never used smokeless tobacco.  Alcohol:      reports no history of alcohol use. Drug Use:  reports no history of drug use. Family History:     Family History   Problem Relation Age of Onset    Heart Disease Mother     Stroke Father        Review of Systems:     Positive and Negative as described in HPI. Review of Systems   Constitutional:  Positive for fatigue. Negative for chills and fever. HENT: Negative. Eyes: Negative. Respiratory: Negative. Cardiovascular: Negative. Gastrointestinal: Negative. Genitourinary: Negative. Musculoskeletal: Negative. Skin: Negative. Neurological:  Positive for syncope. Negative for dizziness, weakness, light-headedness and headaches. Psychiatric/Behavioral: Negative.        Physical Exam:   BP (!) 115/58   Pulse 68   Temp 97.7 °F (36.5 °C)   Resp 15   Ht 5' 8\" (1.727 m)   Wt 180 lb (81.6 kg)   SpO2 97%   BMI 27.37 kg/m²   Temp (24hrs), Av.7 °F (36.5 °C), Min:97.7 °F (36.5 °C), Max:97.7 °F (36.5 °C)    No results for input(s): POCGLU in the last 72 hours. No intake or output data in the 24 hours ending 22 0548    Physical Exam  Vitals and nursing note reviewed. Constitutional:       General: He is not in acute distress. Appearance: He is not ill-appearing, toxic-appearing or diaphoretic. HENT:      Head: Normocephalic. Right Ear: External ear normal.      Left Ear: External ear normal.      Nose: Nose normal.      Mouth/Throat:      Mouth: Mucous membranes are moist.   Eyes:      General: No scleral icterus. Right eye: No discharge. Left eye: No discharge. Extraocular Movements: Extraocular movements intact. Conjunctiva/sclera: Conjunctivae normal.      Pupils: Pupils are equal, round, and reactive to light. Cardiovascular:      Rate and Rhythm: Rhythm irregular. Pulses: Normal pulses. Heart sounds: Normal heart sounds. No murmur heard. No friction rub. No gallop. Comments: Paced rhythm  Pulmonary:      Effort: Pulmonary effort is normal. No respiratory distress. Breath sounds: Normal breath sounds. No wheezing, rhonchi or rales. Abdominal:      General: There is no distension. Palpations: Abdomen is soft. Tenderness: There is no abdominal tenderness. There is no guarding. Hernia: No hernia is present. Comments: Hypoactive bowel sounds   Musculoskeletal:         General: Normal range of motion. Cervical back: Normal range of motion and neck supple. Right lower leg: No edema. Left lower leg: No edema. Skin:     General: Skin is warm and dry. Coloration: Skin is not jaundiced. Findings: No bruising, erythema, lesion or rash. Neurological:      General: No focal deficit present. Mental Status: He is alert and oriented to person, place, and time. Psychiatric:         Mood and Affect: Mood normal.         Behavior: Behavior normal.         Thought Content:  Thought content normal.         Judgment: Judgment normal.       Investigations:      Laboratory Testing:  Recent Results (from the past 24 hour(s))   EKG 12 Lead    Collection Time: 08/21/22  9:50 PM   Result Value Ref Range    Ventricular Rate 55 BPM    Atrial Rate 55 BPM    P-R Interval 262 ms    QRS Duration 110 ms    Q-T Interval 430 ms    QTc Calculation (Bazett) 411 ms    P Axis 50 degrees    R Axis -14 degrees    T Axis 27 degrees   CBC with Auto Differential    Collection Time: 08/21/22 10:12 PM   Result Value Ref Range    WBC 8.3 3.5 - 11.3 k/uL    RBC 3.92 (L) 4.21 - 5.77 m/uL    Hemoglobin 12.7 (L) 13.0 - 17.0 g/dL    Hematocrit 38.0 (L) 40.7 - 50.3 %    MCV 96.9 82.6 - 102.9 fL    MCH 32.4 25.2 - 33.5 pg    MCHC 33.4 28.4 - 34.8 g/dL    RDW 12.9 11.8 - 14.4 %    Platelets 116 665 - 892 k/uL    MPV 9.9 8.1 - 13.5 fL    NRBC Automated 0.0 0.0 per 100 WBC    Seg Neutrophils 80 (H) 36 - 65 %    Lymphocytes 14 (L) 24 - 43 %    Monocytes 5 3 - 12 %    Eosinophils % 1 1 - 4 %    Basophils 0 0 - 2 %    Immature Granulocytes 0 0 %    Segs Absolute 6.68 1.50 - 8.10 k/uL    Absolute Lymph # 1.16 1.10 - 3.70 k/uL    Absolute Mono # 0.39 0.10 - 1.20 k/uL    Absolute Eos # 0.05 0.00 - 0.44 k/uL    Basophils Absolute <0.03 0.00 - 0.20 k/uL    Absolute Immature Granulocyte 0.03 0.00 - 0.30 k/uL   Comprehensive Metabolic Panel    Collection Time: 08/21/22 10:12 PM   Result Value Ref Range    Glucose 144 (H) 70 - 99 mg/dL    BUN 35 (H) 8 - 23 mg/dL    Creatinine 0.90 0.70 - 1.20 mg/dL    Bun/Cre Ratio 39 (H) 9 - 20    Calcium 9.2 8.6 - 10.4 mg/dL    Sodium 135 135 - 144 mmol/L    Potassium 4.2 3.7 - 5.3 mmol/L    Chloride 101 98 - 107 mmol/L    CO2 26 20 - 31 mmol/L    Anion Gap 8 (L) 9 - 17 mmol/L    Alkaline Phosphatase 50 40 - 129 U/L    ALT 18 5 - 41 U/L    AST 19 <40 U/L    Total Bilirubin 0.39 0.3 - 1.2 mg/dL    Total Protein 6.3 (L) 6.4 - 8.3 g/dL    Albumin 4.0 3.5 - 5.2 g/dL    GFR Non-African American >60 >60 mL/min    GFR African American >60 >60 mL/min    GFR Comment         Protime-INR    Collection Time: 08/21/22 10:12 PM   Result Value Ref Range    Protime 13.8 11.5 - 14.2 sec    INR 1.1    APTT    Collection Time: 08/21/22 10:12 PM   Result Value Ref Range    PTT 28.2 23.9 - 33.8 sec   Troponin    Collection Time: 08/21/22 10:12 PM   Result Value Ref Range    Troponin, High Sensitivity 12 0 - 22 ng/L   Brain Natriuretic Peptide    Collection Time: 08/21/22 10:12 PM   Result Value Ref Range    Pro-BNP 87 <300 pg/mL   EKG 12 Lead    Collection Time: 08/21/22 11:12 PM   Result Value Ref Range    Ventricular Rate 60 BPM    Atrial Rate 60 BPM    P-R Interval 282 ms    QRS Duration 96 ms    Q-T Interval 422 ms    QTc Calculation (Bazett) 422 ms    P Axis 59 degrees    R Axis -14 degrees    T Axis 28 degrees   Basic Metabolic Panel w/ Reflex to MG    Collection Time: 08/22/22  3:40 AM   Result Value Ref Range    Glucose 154 (H) 70 - 99 mg/dL    BUN 32 (H) 8 - 23 mg/dL    Creatinine 0.69 (L) 0.70 - 1.20 mg/dL    Bun/Cre Ratio 46 (H) 9 - 20    Calcium 8.9 8.6 - 10.4 mg/dL    Sodium 137 135 - 144 mmol/L    Potassium 4.3 3.7 - 5.3 mmol/L    Chloride 104 98 - 107 mmol/L    CO2 26 20 - 31 mmol/L    Anion Gap 7 (L) 9 - 17 mmol/L    GFR Non-African American >60 >60 mL/min    GFR African American >60 >60 mL/min    GFR Comment         Magnesium    Collection Time: 08/22/22  3:40 AM   Result Value Ref Range    Magnesium 2.0 1.6 - 2.6 mg/dL   Brain Natriuretic Peptide    Collection Time: 08/22/22  3:40 AM   Result Value Ref Range    Pro- <300 pg/mL       Imaging/Diagnostics:  CT HEAD WO CONTRAST    Result Date: 8/22/2022  No acute intracranial abnormality.      XR CHEST PORTABLE    Result Date: 8/21/2022  Nonspecific pulmonary infiltrates are typical for COVID-19 pneumonia, but can be seen with other atypical/viral pneumonia as well. Findings could possibly be related poor inspiration and subsegmental atelectasis. Follow-up full inspiration PA and lateral chest may be useful for better characterization of pulmonary findings. Assessment :      Hospital Problems             Last Modified POA    * (Principal) Arrhythmia 8/21/2022 Yes    Hyperglycemia 8/22/2022 Yes       Plan:     Patient status inpatient in the  Cardiovascular ICU    Arrhythmia/ cardiac pause: Cardiology consult. Transcutaneous pacing. Patient tolerating well. NPO  Hyperglycemia: Check A1c. Monitor BG Ac & HS. Trend BG. Hold chemical DVT prophylaxis for now    Consultations:   IP CONSULT TO SOCIAL WORK  IP CONSULT TO CARDIOLOGY     Patient is admitted as inpatient status because of co-morbidities listed above, severity of signs and symptoms as outlined, requirement for current medical therapies and most importantly because of direct risk to patient if care not provided in a hospital setting. Expected length of stay > 48 hours.     Total 50 mins spent on the completion of this H&P    STEFANIE Gill NP  8/22/2022  5:48 AM    Copy sent to Dr. Mai Aguilar DO

## 2022-08-22 NOTE — ED PROVIDER NOTES
[facility]  Emergency Department Encounter     Pt Name: Francis White  MRN: [id number]  Cherylgfdov 1940  Date of evaluation: 8/22/22  PCP:  Malou Ospina DO    CHIEF COMPLAINT       Chief Complaint   Patient presents with    Loss of Consciousness       HISTORY OFPRESENT ILLNESS  (Location/Symptom, Timing/Onset, Context/Setting, Quality, Duration, Modifying Factors,Severity.)      Francis White is a [de-identified] y.o. male who presents with episode of syncope. He states that he was seated in a chair when he reportedly went unconscious per wife who is a retired cardiac nurse. No seizure-like activity. No tongue biting. Lasted approximately 30 seconds. Patient then awoken. No postictal period was described. Patient's major complaint is fatigue. Denies chest pain, shortness of breath, abdominal pain. States that he just had a \"long day\" and is very tired. Follows with [address] [place] [address] cardiology group. No recent cardiac testing in the past 10 years per patient. PAST MEDICAL / SURGICAL / SOCIAL / FAMILY HISTORY      has a past medical history of Angina pectoris ([address] BPH (benign prostatic hyperplasia), Calcified granuloma of lung ([address] Chest pain, Coronary artery disease, Dizziness, Former smoker, GERD (gastroesophageal reflux disease), HL (hearing loss), [place] (hard of hearing), Hyperkalemia, Mild acid reflux, Myocardial infarction ([address] Nearsightedness, Numbness, Personal history of kidney cancer, Pigmented skin lesion, Skin lesion of chest wall, Skin lesion of scalp, Wears glasses, and Wears partial dentures. has a past surgical history that includes TURP (06/05/2017); cystourethroscopy (06/05/2017); Kidney stone surgery (Left, 1970); Lithotripsy; Kidney stone surgery; partial nephrectomy (2001); Inguinal hernia repair (Right); Inguinal hernia repair (Left); Incisional hernia repair (Left); and Cataract removal with implant (Left).     Social History     Socioeconomic History Marital status:      Spouse name: Not on file    Number of children: Not on file    Years of education: Not on file    Highest education level: Not on file   Occupational History    Not on file   Tobacco Use    Smoking status: Former     Packs/day: 0.02     Years: 3.00     Pack years: 0.06     Types: Cigarettes     Quit date: 7/10/1972     Years since quittin.1    Smokeless tobacco: Never    Tobacco comments:     Used to borrow cigarettes, but quit when he realized he was starting to look forward to them. Vaping Use    Vaping Use: Never used   Substance and Sexual Activity    Alcohol use: No    Drug use: No    Sexual activity: Not on file   Other Topics Concern    Not on file   Social History Narrative    Not on file     Social Determinants of Health     Financial Resource Strain: Not on file   Food Insecurity: Not on file   Transportation Needs: Not on file   Physical Activity: Not on file   Stress: Not on file   Social Connections: Not on file   Intimate Partner Violence: Not on file   Housing Stability: Not on file       Family History   Problem Relation Age of Onset    Heart Disease Mother     Stroke Father        Allergies:  Patient has no known allergies. Home Medications:  Prior to Admission medications    Medication Sig Start Date End Date Taking?  Authorizing Provider   Multiple Vitamin (MULTI-VITAMIN DAILY PO) Take by mouth    Historical Provider, MD   donepezil (ARICEPT) 5 MG tablet Take 10 mg by mouth nightly     Historical Provider, MD   Omega-3 Fatty Acids (OMEGA-3 FISH OIL PO) Take 2 capsules by mouth daily    Historical Provider, MD   Misc Natural Products (MATURE ANA HERBAL REMEDY) CAPS Take 2 capsules by mouth 3 times daily JuicePlus (2 fruit, 2 grapes and berries, 2 vegetables / per day)  Patient not taking: Reported on 3/25/2022  8/22/22  Historical Provider, MD   Coenzyme Q10 200 MG CAPS Take 1 capsule by mouth daily    Historical Provider, MD   allopurinol (ZYLOPRIM) 300 MG tablet Take 300 mg by mouth daily  5/15/17   Historical Provider, MD   Flaxseed Linseed, (FLAXSEED OIL PO) Take 2 capsules by mouth daily    Historical Provider, MD   aspirin 81 MG tablet Take 81 mg by mouth daily  Patient not taking: Reported on 3/25/2022  8/22/22  Historical Provider, MD       REVIEW OF SYSTEMS    (2-9 systems for level 4, 10 or more for level 5)      Review of Systems   Constitutional:  Positive for fatigue. Negative for chills and fever. Eyes:  Negative for discharge and redness. Respiratory:  Negative for shortness of breath. Cardiovascular:  Negative for chest pain. Gastrointestinal:  Negative for abdominal pain. Genitourinary:  Negative for dysuria. Musculoskeletal:  Negative for arthralgias. Skin:  Negative for color change and rash. Allergic/Immunologic: Negative for environmental allergies. Neurological:  Positive for syncope. Psychiatric/Behavioral:  Negative for agitation and confusion. PHYSICAL EXAM   (up to 7 for level 4, 8 or more for level 5)     INITIAL VITALS:    height is 5' 8\" (1.727 m) and weight is 180 lb (81.6 kg). His temporal temperature is 98.9 °F (37.2 °C). His blood pressure is 114/60 and his pulse is 68. His respiration is 15 and oxygen saturation is 97%. Physical Exam  Vitals and nursing note reviewed. Constitutional:       General: He is not in acute distress. Appearance: He is well-developed. He is ill-appearing and diaphoretic. He is not toxic-appearing. HENT:      Head: Normocephalic and atraumatic. Nose: Nose normal.      Mouth/Throat:      Mouth: Mucous membranes are moist.   Eyes:      General: No scleral icterus. Conjunctiva/sclera: Conjunctivae normal.      Pupils: Pupils are equal, round, and reactive to light. Neck:      Trachea: No tracheal deviation. Cardiovascular:      Rate and Rhythm: Regular rhythm. Bradycardia present. Heart sounds: Normal heart sounds. No murmur heard. No friction rub.  No peripheral IV    ADMIT TO INPATIENT       MEDICATIONS ORDERED:  Orders Placed This Encounter   Medications    ondansetron (ZOFRAN) 4 MG/2ML injection     BEATRIZ SO: cabinet override    ondansetron (ZOFRAN) injection 4 mg    fentaNYL (SUBLIMAZE) injection 25 mcg    0.9 % sodium chloride infusion    sodium chloride flush 0.9 % injection 5-40 mL    sodium chloride flush 0.9 % injection 10 mL    0.9 % sodium chloride infusion    OR Linked Order Group     potassium chloride (KLOR-CON M) extended release tablet 40 mEq     potassium bicarb-citric acid (EFFER-K) effervescent tablet 40 mEq     potassium chloride 10 mEq/100 mL IVPB (Peripheral Line)    magnesium sulfate 1000 mg in dextrose 5% 100 mL IVPB    enoxaparin (LOVENOX) injection 40 mg     Order Specific Question:   Indication of Use     Answer:   Prophylaxis-DVT/PE    OR Linked Order Group     ondansetron (ZOFRAN-ODT) disintegrating tablet 4 mg     ondansetron (ZOFRAN) injection 4 mg    polyethylene glycol (GLYCOLAX) packet 17 g    OR Linked Order Group     acetaminophen (TYLENOL) tablet 650 mg     acetaminophen (TYLENOL) suppository 650 mg    allopurinol (ZYLOPRIM) tablet 300 mg    donepezil (ARICEPT) tablet 10 mg    multivitamin 1 tablet       DDX: Cardiogenic syncope versus vasovagal syncope versus dehydration    Initial MDM/Plan: [de-identified] y.o. male who presents with reported syncope and fatigue. Story concerning for cardiac origin. Laboratory work-up, initial EKG showing some mild bradycardia. On no beta-blockers or calcium channel blockers. No complaints currently otherwise from fatigue.     DIAGNOSTIC RESULTS / EMERGENCY DEPARTMENT COURSE / MDM     LABS:  Labs Reviewed   CBC WITH AUTO DIFFERENTIAL - Abnormal; Notable for the following components:       Result Value    RBC 3.92 (*)     Hemoglobin 12.7 (*)     Hematocrit 38.0 (*)     Seg Neutrophils 80 (*)     Lymphocytes 14 (*)     All other components within normal limits   COMPREHENSIVE METABOLIC PANEL - Abnormal; Notable for the following components:    Glucose 144 (*)     BUN 35 (*)     Bun/Cre Ratio 39 (*)     Anion Gap 8 (*)     Total Protein 6.3 (*)     All other components within normal limits   BASIC METABOLIC PANEL W/ REFLEX TO MG FOR LOW K - Abnormal; Notable for the following components:    Glucose 154 (*)     BUN 32 (*)     Creatinine 0.69 (*)     Bun/Cre Ratio 46 (*)     Anion Gap 7 (*)     All other components within normal limits   POC GLUCOSE FINGERSTICK - Abnormal; Notable for the following components:    POC Glucose 113 (*)     All other components within normal limits   PROTIME-INR   APTT   TROPONIN   BRAIN NATRIURETIC PEPTIDE   MAGNESIUM   BRAIN NATRIURETIC PEPTIDE   URINALYSIS WITH MICROSCOPIC   HEMOGLOBIN A1C   POCT GLUCOSE   POCT GLUCOSE   POCT GLUCOSE         RADIOLOGY:  CT HEAD WO CONTRAST    Result Date: 8/22/2022  EXAMINATION: CT OF THE HEAD WITHOUT CONTRAST  8/21/2022 10:39 pm TECHNIQUE: CT of the head was performed without the administration of intravenous contrast. Automated exposure control, iterative reconstruction, and/or weight based adjustment of the mA/kV was utilized to reduce the radiation dose to as low as reasonably achievable. COMPARISON: None. HISTORY: ORDERING SYSTEM PROVIDED HISTORY: Syncope TECHNOLOGIST PROVIDED HISTORY: Syncope Decision Support Exception - unselect if not a suspected or confirmed emergency medical condition->Emergency Medical Condition (MA) FINDINGS: BRAIN/VENTRICLES: There is no acute intracranial hemorrhage, mass effect or midline shift. No abnormal extra-axial fluid collection. The gray-white differentiation is maintained without evidence of an acute infarct. There is prominence of the ventricles and sulci due to global parenchymal volume loss. There are nonspecific areas of hypoattenuation within the periventricular and subcortical white matter, which likely represent chronic microvascular ischemic change.  ORBITS: The visualized portion of the orbits demonstrate no acute abnormality. SINUSES: The visualized paranasal sinuses and mastoid air cells demonstrate no acute abnormality. SOFT TISSUES/SKULL: No acute abnormality of the visualized skull or soft tissues. No acute intracranial abnormality. XR CHEST PORTABLE    Result Date: 8/21/2022  EXAMINATION: ONE XRAY VIEW OF THE CHEST 8/21/2022 10:21 pm COMPARISON: None. HISTORY: ORDERING SYSTEM PROVIDED HISTORY: Syncope FINDINGS: The cardiomediastinal and hilar silhouettes appear unremarkable. Scattered pulmonary opacities bilateral mid and lower lungs. No pleural effusion evident. No pneumothorax is seen. No acute osseous abnormality is identified. Nonspecific pulmonary infiltrates are typical for COVID-19 pneumonia, but can be seen with other atypical/viral pneumonia as well. Findings could possibly be related poor inspiration and subsegmental atelectasis. Follow-up full inspiration PA and lateral chest may be useful for better characterization of pulmonary findings. EKG  Rhythm: Sinus rhythm, but Bradycardic  Rate: Bradycardic  Axis: normal  Conduction: First-degree AV block  ST Segments: no acute change  T Waves: no acute change  Q Waves: no acute change    Clinical Impression: Sinus Bradycardia. With first-degree AV block    All EKG's are interpreted by the Emergency Department Physician who either signs or Co-signs this chart in the absence of a cardiologist.    EMERGENCY DEPARTMENT COURSE:  ED Course as of 08/22/22 0820   Sun Aug 21, 2022   2210 No head laceration. Patient has healing Mohs procedure [MS]   2301 Patient was waiting results in emergency department when was called to bedside by nursing patient had an episode of unresponsiveness lasting approximately 20 to 30 seconds with rapid improvement. Prior to episode patient states that he felt nauseous but did not vomit. He regained consciousness quite quickly after. No postictal period observed.   Upon review of rhythm strip appears to be in normal sinus and then degraded into complete asystole with possible occasional atrial escape. Patient then spontaneously has resumption of normal cardiac activity. Patient does wax and wane between tachycardia and bradycardia raising suspicion for sick sinus syndrome. Family in room who are nurses state that patient had the exact same episode which prompted the visit today. Fast patches applied. Transcutaneously pacing if necessary. Will discuss with [facility] cardiology group as he is followed with them previously. Most likely will require pacemaker. Denying any history of chest pain stating he just feels tired and had a long day. [MS]      ED Course User Index  [MS] Merry DO Taras     As detailed above laboratory work-up largely unremarkable. Electrolytes normal.  Occult blood bedside by nursing as patient became unresponsive. When I arrived patient had palpable pulse. Had organized cardiac rhythm on monitor. On review of cardiac telemetry patient had approximately 20sec episode of asystole with possibly some atrial activity. Spontaneous resumption of narrow complex normal sinus rhythm. Repeat EKG continuing to show normal sinus rhythm with a first-degree AV block. Patient alternating between mild tachycardia and bradycardia. Remains sinus. No signs of A-V dissociation. Continued to get extremely bradycardic and nauseous and symptomatic. Started on transcutaneously pacing. Rate of 70. Normotensive. Minimal pain from pacing. Given 25 of fentanyl with complete pain relief. Discussed case with on-call diagnostic cardiologist at [facility]. States will have EP group see patient in a.m. And most likely place pacemaker. As patient is tolerating transcutaneous pacing currently, is agreeable to patient being paced on telemetry a.m. as needed transcutaneously. Would recommend against dopamine given arrhythmia genic properties.   Atropine if required, isoproterenol at low-dose also could be used. Would attempt to not place temporary transvenous pacemaker if possible given patient will be getting pacemaker tomorrow. Case discussed with on-call critical care Dr. Rex David as well as hospitalist service agreeable to admission. PROCEDURES:  None    CONSULTS:  IP CONSULT TO SOCIAL WORK  IP CONSULT TO CARDIOLOGY    CRITICAL CARE:  Due to the high probability of sudden and clinically significant deterioration in the patient's condition patient required highest level of my preparedness to intervene urgently. I provided critical care time including documentation time, medication orders and management, reevaluation, vital sign assessment, ordering and reviewing of of lab tests ordering and reviewing of x-ray studies, and admission orders. Aggregate critical care time is 60 minutes including only time during which I was engaged in work directly related to patient care and did not include time spent treating other patients simultaneously. FINAL IMPRESSION      1. Syncope, cardiogenic    2. First degree AV block          DISPOSITION / PLAN     DISPOSITION Admitted 08/21/2022 11:56:40 PM        PATIENTREFERRED TO:  No follow-up provider specified.     DISCHARGE MEDICATIONS:  Current Discharge Medication List          Modesto Leone DO  EmergencyMedicine Attending    (Please note that portions of this note were completed with a voice recognition program.  Efforts were made to edit the dictations but occasionally words are mis-transcribed.)       Modesto Leone DO  08/22/22 [id number]

## 2022-08-22 NOTE — PROGRESS NOTES
Patient arrived to room 2029 via hospital bed with ED RN Jr Phillips and house supervisor Kathy Dickinson. Telephone report completed prior to arrival. Pt arrived on continuous monitoring and transcutaneous pacing via lifeKiddy. Pt states he can feel the pacing but is not in any pain at this time. Pacer is set to a rate of 70 at 40mA with capture. All other vital signs are stable. Patient A&Ox4. Patient oriented to room and provided call light. Side rails up x2. All questions answered. No needs at this time.

## 2022-08-22 NOTE — ED NOTES
Pt had run of SVT followed by asystole and subsequent LOC. Pt self-conducted back to SR. Strips posted. Pt immediately placed on code cart monitor and change room to 22 for closer observation.   Family at bedside, MD updated them and explained what had just happened     Brown Severance, RN  08/21/22 [id number]

## 2022-08-22 NOTE — ED NOTES
Pt wife called ED, updated. She had concern about him being on Cpap. Writer assured her we are monitoring his every breath and SpO2. Should he become apnec we would have respiratory place him on the proper machine.      Emmanuel Day RN  08/22/22 [id number]

## 2022-08-22 NOTE — PROGRESS NOTES
[facility]  PROGRESS NOTE    Room # 2029/2029-01   Name: Zenaida Richard            Jewish: [address] [address]     Reason for visit: Routine    I visited the patient and family. Admit Date & Time: 8/21/2022  9:49 PM    Assessment:  Zenaida Richard is a [de-identified] y.o. male in the hospital because he has an abnormal heart rate. Upon entering the room, pt. Is found resting in bed with his spouse and dtr at bedside. Intervention:  I introduced myself and my title as  I offered space for pt. And family  to express feelings, needs, and concerns and provided a ministry presence. Pt. Spoke of being transporter at [facility] and his experience with pt's who had experienced a near death experience.  provided prayer at bedside prior to pt's surgery to place pace maker. Outcome:  Pt. Calm and hopeful. Plan:  Chaplains will remain available to offer spiritual and emotional support as needed.     Electronically signed by Derian Francois on 8/22/2022 at 8:09 AM.  Pat

## 2022-08-22 NOTE — FLOWSHEET NOTE
08/22/22 0945   Treatment Team Notification   Reason for Communication Evaluate; Review case   Team Member Name [id number] [facility], NP   Treatment Team Role Advanced Practice Nurse   Method of Communication Call   Response See orders     [address] [facility] NP called for update on patient status. Order to d/c lovenox. New orders to turn external pacer down to 40 bpm from 70 bpm to unveil underlying rhythm, RN able to go up to 50 bpm if needed. Bedrest ordered until further evaluation and treatment from cardiology. Tentative plan for permanent pacemaker placement. Time TBD.

## 2022-08-22 NOTE — CARE COORDINATION
Pt admitted for arrhythmia.     Currently being transferred to [facility] per [facility] for pacemaker placement with his cardiologist.
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
## 2022-08-22 NOTE — ED NOTES
Pt placed in hospital bed, external urine catheter to suction applied     Sydnee Chandra RN  08/22/22 7513

## 2022-08-23 LAB
EKG ATRIAL RATE: 55 BPM
EKG ATRIAL RATE: 60 BPM
EKG P AXIS: 50 DEGREES
EKG P AXIS: 59 DEGREES
EKG P-R INTERVAL: 262 MS
EKG P-R INTERVAL: 282 MS
EKG Q-T INTERVAL: 422 MS
EKG Q-T INTERVAL: 430 MS
EKG QRS DURATION: 110 MS
EKG QRS DURATION: 96 MS
EKG QTC CALCULATION (BAZETT): 411 MS
EKG QTC CALCULATION (BAZETT): 422 MS
EKG R AXIS: -14 DEGREES
EKG R AXIS: -14 DEGREES
EKG T AXIS: 27 DEGREES
EKG T AXIS: 28 DEGREES
EKG VENTRICULAR RATE: 55 BPM
EKG VENTRICULAR RATE: 60 BPM

## 2022-08-23 PROCEDURE — 93010 ELECTROCARDIOGRAM REPORT: CPT | Performed by: INTERNAL MEDICINE

## 2023-01-17 ENCOUNTER — TELEPHONE (OUTPATIENT)
Dept: PULMONOLOGY | Age: 83
End: 2023-01-17

## 2023-01-17 DIAGNOSIS — G47.33 OSA ON CPAP: Primary | ICD-10-CM

## 2023-01-17 DIAGNOSIS — Z99.89 OSA ON CPAP: Primary | ICD-10-CM

## 2023-01-17 NOTE — TELEPHONE ENCOUNTER
Patient called and would like to switch DME companies to [facility] but they will need a new script.  I pended the order for you to sign and Ill get faxed

## 2023-01-18 NOTE — TELEPHONE ENCOUNTER
Tied to contact the patient to let them know the new order has been sent because they wanted to call DME company afterwards to make sure patient gets the right mask

## 2023-03-31 ENCOUNTER — OFFICE VISIT (OUTPATIENT)
Dept: PULMONOLOGY | Age: 83
End: 2023-03-31

## 2023-03-31 VITALS
TEMPERATURE: 97.6 F | RESPIRATION RATE: 14 BRPM | HEART RATE: 75 BPM | WEIGHT: 176 LBS | SYSTOLIC BLOOD PRESSURE: 131 MMHG | BODY MASS INDEX: 26.67 KG/M2 | OXYGEN SATURATION: 97 % | HEIGHT: 68 IN | DIASTOLIC BLOOD PRESSURE: 76 MMHG

## 2023-03-31 DIAGNOSIS — Z99.89 OSA ON CPAP: Primary | ICD-10-CM

## 2023-03-31 DIAGNOSIS — G47.33 OSA ON CPAP: Primary | ICD-10-CM

## 2023-03-31 ASSESSMENT — SLEEP AND FATIGUE QUESTIONNAIRES
HOW LIKELY ARE YOU TO NOD OFF OR FALL ASLEEP WHILE LYING DOWN TO REST IN THE AFTERNOON WHEN CIRCUMSTANCES PERMIT: 2
HOW LIKELY ARE YOU TO NOD OFF OR FALL ASLEEP WHILE SITTING AND READING: 2
HOW LIKELY ARE YOU TO NOD OFF OR FALL ASLEEP WHILE SITTING AND TALKING TO SOMEONE: 0
HOW LIKELY ARE YOU TO NOD OFF OR FALL ASLEEP WHILE WATCHING TV: 0
HOW LIKELY ARE YOU TO NOD OFF OR FALL ASLEEP WHILE SITTING INACTIVE IN A PUBLIC PLACE: 0
HOW LIKELY ARE YOU TO NOD OFF OR FALL ASLEEP IN A CAR, WHILE STOPPED FOR A FEW MINUTES IN TRAFFIC: 0
ESS TOTAL SCORE: 5
HOW LIKELY ARE YOU TO NOD OFF OR FALL ASLEEP WHILE SITTING QUIETLY AFTER LUNCH WITHOUT ALCOHOL: 0
HOW LIKELY ARE YOU TO NOD OFF OR FALL ASLEEP WHEN YOU ARE A PASSENGER IN A CAR FOR AN HOUR WITHOUT A BREAK: 1

## 2023-04-01 ASSESSMENT — ENCOUNTER SYMPTOMS
RESPIRATORY NEGATIVE: 1
EYES NEGATIVE: 1

## 2023-04-01 NOTE — PROGRESS NOTES
in this encounter. Orders Placed This Encounter   Procedures    ID DURABLE MEDICAL EQUIPMENT MI     New CPAP mask tubing and supplies. Return in about 1 year (around 3/31/2024).        Electronically signed by Pranay Crum DO on 4/1/2023at 12:02 PM

## 2024-02-06 ENCOUNTER — TELEPHONE (OUTPATIENT)
Dept: PULMONOLOGY | Age: 84
End: 2024-02-06

## 2024-02-06 DIAGNOSIS — G47.33 OSA ON CPAP: Primary | ICD-10-CM

## 2024-02-06 NOTE — TELEPHONE ENCOUNTER
Patients wife called stating that a letter needs to be sent to try to get her husbands Cpap replaced early.  I explained to patients wife that we normally dont sent letters like that but I can get an order to have machine repaired or replaced.  Patients wife is going to call DME company back to see if there is anything that they can fax us to be filled out in the mean time I've pended a new script to have machine repaired or replaced.  Can you please sign pended order.

## 2024-02-07 NOTE — TELEPHONE ENCOUNTER
Spoke with patients wife and she stated all is well.  They spoke with [facility] and have an appt this Friday

## 2024-02-07 NOTE — TELEPHONE ENCOUNTER
Faxed new order to [facility] to have machine repaired or replaced due to [facility] being the original one to give patient machine.  I spoke with patients wife and informed and provided her with [facility] number.

## 2024-03-22 ENCOUNTER — OFFICE VISIT (OUTPATIENT)
Dept: PULMONOLOGY | Age: 84
End: 2024-03-22
Payer: MEDICARE

## 2024-03-22 VITALS
SYSTOLIC BLOOD PRESSURE: 129 MMHG | DIASTOLIC BLOOD PRESSURE: 62 MMHG | BODY MASS INDEX: 26.67 KG/M2 | OXYGEN SATURATION: 97 % | TEMPERATURE: 98.4 F | RESPIRATION RATE: 12 BRPM | HEART RATE: 89 BPM | HEIGHT: 68 IN | WEIGHT: 176 LBS

## 2024-03-22 DIAGNOSIS — G47.33 OSA ON CPAP: Primary | ICD-10-CM

## 2024-03-22 PROCEDURE — 1123F ACP DISCUSS/DSCN MKR DOCD: CPT | Performed by: INTERNAL MEDICINE

## 2024-03-22 PROCEDURE — 99213 OFFICE O/P EST LOW 20 MIN: CPT | Performed by: INTERNAL MEDICINE

## 2024-03-22 ASSESSMENT — SLEEP AND FATIGUE QUESTIONNAIRES
HOW LIKELY ARE YOU TO NOD OFF OR FALL ASLEEP WHILE SITTING AND READING: SLIGHT CHANCE OF DOZING
HOW LIKELY ARE YOU TO NOD OFF OR FALL ASLEEP WHILE LYING DOWN TO REST IN THE AFTERNOON WHEN CIRCUMSTANCES PERMIT: HIGH CHANCE OF DOZING
HOW LIKELY ARE YOU TO NOD OFF OR FALL ASLEEP WHILE SITTING INACTIVE IN A PUBLIC PLACE: WOULD NEVER DOZE
HOW LIKELY ARE YOU TO NOD OFF OR FALL ASLEEP WHILE SITTING QUIETLY AFTER LUNCH WITHOUT ALCOHOL: WOULD NEVER DOZE
ESS TOTAL SCORE: 4
HOW LIKELY ARE YOU TO NOD OFF OR FALL ASLEEP WHEN YOU ARE A PASSENGER IN A CAR FOR AN HOUR WITHOUT A BREAK: WOULD NEVER DOZE
HOW LIKELY ARE YOU TO NOD OFF OR FALL ASLEEP IN A CAR, WHILE STOPPED FOR A FEW MINUTES IN TRAFFIC: WOULD NEVER DOZE
HOW LIKELY ARE YOU TO NOD OFF OR FALL ASLEEP WHILE SITTING AND TALKING TO SOMEONE: WOULD NEVER DOZE
HOW LIKELY ARE YOU TO NOD OFF OR FALL ASLEEP WHILE WATCHING TV: WOULD NEVER DOZE

## 2024-03-22 ASSESSMENT — ENCOUNTER SYMPTOMS
EYES NEGATIVE: 1
RESPIRATORY NEGATIVE: 1

## 2024-03-22 NOTE — PROGRESS NOTES
is well-developed.   HENT:      Nose: Nose normal. No congestion.      Mouth/Throat:      Mouth: Mucous membranes are moist.      Pharynx: Oropharynx is clear. No oropharyngeal exudate.   Eyes:      General: No scleral icterus.     Conjunctiva/sclera: Conjunctivae normal.   Neck:      Thyroid: No thyromegaly.      Vascular: No JVD.      Trachea: No tracheal deviation.   Cardiovascular:      Rate and Rhythm: Normal rate and regular rhythm.      Heart sounds: Normal heart sounds. No murmur heard.     No gallop.   Pulmonary:      Effort: Pulmonary effort is normal. No respiratory distress.      Breath sounds: No wheezing or rales.   Chest:      Chest wall: No tenderness.   Abdominal:      Palpations: Abdomen is soft.      Tenderness: There is no abdominal tenderness.   Musculoskeletal:      Cervical back: Neck supple.   Lymphadenopathy:      Cervical: No cervical adenopathy.   Skin:     General: Skin is warm and dry.      Findings: No lesion or rash.   Neurological:      Mental Status: He is alert and oriented to person, place, and time.       Wt Readings from Last 3 Encounters:   03/22/24 79.8 kg (176 lb)   03/31/23 79.8 kg (176 lb)   08/21/22 81.6 kg (180 lb)     Results for orders placed or performed during the hospital encounter of 08/21/22   CBC with Auto Differential   Result Value Ref Range    WBC 8.3 3.5 - 11.3 k/uL    RBC 3.92 (L) 4.21 - 5.77 m/uL    Hemoglobin 12.7 (L) 13.0 - 17.0 g/dL    Hematocrit 38.0 (L) 40.7 - 50.3 %    MCV 96.9 82.6 - 102.9 fL    MCH 32.4 25.2 - 33.5 pg    MCHC 33.4 28.4 - 34.8 g/dL    RDW 12.9 11.8 - 14.4 %    Platelets 164 138 - 453 k/uL    MPV 9.9 8.1 - 13.5 fL    NRBC Automated 0.0 0.0 per 100 WBC    Seg Neutrophils 80 (H) 36 - 65 %    Lymphocytes 14 (L) 24 - 43 %    Monocytes 5 3 - 12 %    Eosinophils % 1 1 - 4 %    Basophils 0 0 - 2 %    Immature Granulocytes 0 0 %    Segs Absolute 6.68 1.50 - 8.10 k/uL    Absolute Lymph # 1.16 1.10 - 3.70 k/uL    Absolute Mono # 0.39 0.10 - 1.20

## 2024-11-01 NOTE — ED NOTES
Writer at bedside, pt states he feels nauseous and then went unresponsive. Pt HR noted to be zero. Dr. Preciado Freya to room. Crash cart at bedside. Pt then became responsive. Pt placed on fast patches.       Oma Amezcua RN  08/21/22 8155 Abnormal Lactate: > 2

## 2025-03-14 ENCOUNTER — OFFICE VISIT (OUTPATIENT)
Dept: PULMONOLOGY | Age: 85
End: 2025-03-14
Payer: MEDICARE

## 2025-03-14 VITALS
HEART RATE: 86 BPM | TEMPERATURE: 98.2 F | DIASTOLIC BLOOD PRESSURE: 74 MMHG | RESPIRATION RATE: 18 BRPM | HEIGHT: 68 IN | OXYGEN SATURATION: 97 % | SYSTOLIC BLOOD PRESSURE: 135 MMHG | BODY MASS INDEX: 27.52 KG/M2 | WEIGHT: 181.6 LBS

## 2025-03-14 DIAGNOSIS — G47.33 OSA ON CPAP: Primary | ICD-10-CM

## 2025-03-14 PROCEDURE — 1123F ACP DISCUSS/DSCN MKR DOCD: CPT | Performed by: STUDENT IN AN ORGANIZED HEALTH CARE EDUCATION/TRAINING PROGRAM

## 2025-03-14 PROCEDURE — 99213 OFFICE O/P EST LOW 20 MIN: CPT | Performed by: STUDENT IN AN ORGANIZED HEALTH CARE EDUCATION/TRAINING PROGRAM

## 2025-03-14 PROCEDURE — 1159F MED LIST DOCD IN RCRD: CPT | Performed by: STUDENT IN AN ORGANIZED HEALTH CARE EDUCATION/TRAINING PROGRAM

## 2025-03-14 ASSESSMENT — SLEEP AND FATIGUE QUESTIONNAIRES
ESS TOTAL SCORE: 8
HOW LIKELY ARE YOU TO NOD OFF OR FALL ASLEEP WHILE SITTING INACTIVE IN A PUBLIC PLACE: WOULD NEVER DOZE
HOW LIKELY ARE YOU TO NOD OFF OR FALL ASLEEP IN A CAR, WHILE STOPPED FOR A FEW MINUTES IN TRAFFIC: WOULD NEVER DOZE
HOW LIKELY ARE YOU TO NOD OFF OR FALL ASLEEP WHILE WATCHING TV: MODERATE CHANCE OF DOZING
HOW LIKELY ARE YOU TO NOD OFF OR FALL ASLEEP WHILE LYING DOWN TO REST IN THE AFTERNOON WHEN CIRCUMSTANCES PERMIT: HIGH CHANCE OF DOZING
HOW LIKELY ARE YOU TO NOD OFF OR FALL ASLEEP WHILE SITTING AND READING: SLIGHT CHANCE OF DOZING
HOW LIKELY ARE YOU TO NOD OFF OR FALL ASLEEP WHILE SITTING QUIETLY AFTER LUNCH WITHOUT ALCOHOL: MODERATE CHANCE OF DOZING
HOW LIKELY ARE YOU TO NOD OFF OR FALL ASLEEP WHEN YOU ARE A PASSENGER IN A CAR FOR AN HOUR WITHOUT A BREAK: WOULD NEVER DOZE
HOW LIKELY ARE YOU TO NOD OFF OR FALL ASLEEP WHILE SITTING AND TALKING TO SOMEONE: WOULD NEVER DOZE

## 2025-03-14 NOTE — PATIENT INSTRUCTIONS
If your provider ordered CPAP/ BiPAP supplies for you the order will be faxed to your DME supplier once the office visit note is complete. Please follow up with them for your supplies.    3/14/25mm: CALLY- [facility]

## 2025-03-14 NOTE — PROGRESS NOTES
[facility]  Office visit follow-up  ______________________________________________________________________________    Patient: Perez Holland  YOB: 1940   MRN: [id number]    Acct:      Today's date: 03/14/25    Chief complaint   Follow up     History of present illness     A 85 y.o. male       Pt seen and Chart reviewed.  Perez Holland is here in followup for   1. YARIEL on CPAP          Interval history: 03/14/25  History of Present Illness  The patient presents for evaluation of sleep apnea.    He has been under the care of  and has been utilizing a CPAP machine since 2020, which he reports as beneficial.   He uses it every night. 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   His weight has remained stable at 178 pounds since 2019.   He reports feeling energetic during the day. However, he experiences frequent awakenings at night, necessitating urination at least 3 times per night.   Post-urination, he finds it challenging to return to sleep, often taking approximately 40 minutes to do so.   His urologist has proposed various medications to alleviate this issue, but he is hesitant to use them due to potential side effects.     The compliance report from 12/7/2025 to 3/6/2025 showed the patient is very compliant with the CPAP machine use with average usage of 7 hours and 36-minute, he is currently on CPAP with auto adjustment with minimum pressure of 7 cm H2O and maximum pressure 15 cm H2O, average pressure about 9 cm H2O and AHI 1.8.                 ROS:     Constitutional:no fever, no chills  HEENT: no runny nose, no sore throat  Respiratory: no dyspnea, no cough, no wheeze, no sputum production  CVS: no chest pain, no palpitations, no syncope  Gi: no abdominal pain, no nausea, no vomiting, no diarrhea.  MSK: no myalgia, no joint pain.  Skin: no